# Patient Record
Sex: FEMALE | Race: WHITE | Employment: FULL TIME | URBAN - METROPOLITAN AREA
[De-identification: names, ages, dates, MRNs, and addresses within clinical notes are randomized per-mention and may not be internally consistent; named-entity substitution may affect disease eponyms.]

---

## 2017-11-13 ENCOUNTER — OFFICE VISIT (OUTPATIENT)
Dept: FAMILY MEDICINE CLINIC | Age: 32
End: 2017-11-13

## 2017-11-13 VITALS
SYSTOLIC BLOOD PRESSURE: 114 MMHG | WEIGHT: 126 LBS | DIASTOLIC BLOOD PRESSURE: 68 MMHG | RESPIRATION RATE: 18 BRPM | OXYGEN SATURATION: 99 % | HEIGHT: 65 IN | HEART RATE: 100 BPM | TEMPERATURE: 98.3 F | BODY MASS INDEX: 20.99 KG/M2

## 2017-11-13 DIAGNOSIS — N61.0 MASTITIS, RIGHT, ACUTE: Primary | ICD-10-CM

## 2017-11-13 RX ORDER — CEPHALEXIN 500 MG/1
500 CAPSULE ORAL 4 TIMES DAILY
Qty: 40 CAP | Refills: 0 | Status: SHIPPED | OUTPATIENT
Start: 2017-11-13 | End: 2017-11-23

## 2017-11-13 NOTE — PROGRESS NOTES
Patient: Saira Farah MRN: [id number]  SSN: [id number]    YOB: 1985  Age: 27 y.o. Sex: female      Date of Service: 11/13/2017   Provider: AN Bergeron   Office Location:   [address] [facility], [address] [place] [address], [address]  Office Phone: [phone]  Office Fax: [phone]        REASON FOR VISIT:   Chief Complaint   Patient presents with    Breast pain     pt. states right sided breast pain, states it is a sharp achy pain and tender to the touch         VITALS:   Visit Vitals    /68 (BP 1 Location: Left arm, BP Patient Position: Sitting)    Pulse 100    Temp 98.3 °F (36.8 °C) (Oral)    Resp 18    Ht 5' 5\" (1.651 m)    Wt 126 lb (57.2 kg)    SpO2 99%    BMI 20.97 kg/m2       MEDICATIONS:   Current Outpatient Prescriptions   Medication Sig Dispense Refill    docusate sodium (COLACE) 100 mg capsule Take 100 mg by mouth two (2) times daily as needed for Constipation.  PRENATAL VITS W-CA,FE,FA,<1MG, (PRENATAL #2 PO) Take 1 Tab by mouth daily.  ranitidine (ZANTAC) 150 mg tablet Take 150 mg by mouth two (2) times a day.  Indications: HEARTBURN          ALLERGIES:   No Known Allergies     ACTIVE MEDICAL PROBLEMS:  Patient Active Problem List   Diagnosis Code   (none) - all problems resolved or deleted        MEDICAL/SURGICAL HISTORY:  Past Medical History:   Diagnosis Date    Thyroiditis       Past Surgical History:   Procedure Laterality Date    HX APPENDECTOMY  200    HX HERNIA REPAIR  1989        FAMILY HISTORY:  Family History   Problem Relation Age of Onset    Heart Disease Father     Hypertension Father     Hypertension Maternal Grandmother     Hypertension Maternal Grandfather     Hypertension Paternal Grandmother     Hypertension Paternal Grandfather         SOCIAL HISTORY:  Social History   Substance Use Topics    Smoking status: Former Smoker     Years: 5.00     Quit date: 11/8/2009    Smokeless tobacco: Never Used      Comment: Educated on second hand smoke    Alcohol use No        HISTORY OF PRESENT ILLNESS:   Rosalia Byers is a 27 y.o. female who presents to the office as a [address] patient for acute care. PCP: Shiloh Lang    Patient complains of redness, tenderness, and warmth in her R breast, as well as subjective fever, since this morning. Patient is breast feeding her 9 month old son, and suspects she may have mastitis, though she has no prior history of this. She has been applying warm compresses, continuing to pump her breast milk, and taking Tylenol and ibuprofen with minimal improvement in symptoms. She denies purulent nipple drainage or breast lumps/masses. REVIEW OF SYSTEMS:  Review of Systems   Constitutional: Positive for fever ( subjective) and malaise/fatigue. Negative for chills. Respiratory: Negative for cough, shortness of breath and wheezing. Cardiovascular: Negative for chest pain and palpitations. Gastrointestinal: Negative for diarrhea, nausea and vomiting. PHYSICAL EXAMINATION:  Physical Exam   Constitutional: She is oriented to person, place, and time and well-developed, well-nourished, and in no distress. Cardiovascular: Normal rate, regular rhythm and normal heart sounds. Exam reveals no gallop and no friction rub. No murmur heard. Pulmonary/Chest: Effort normal and breath sounds normal. She has no wheezes. She has no rales. Neurological: She is alert and oriented to person, place, and time. Gait normal.   Skin: Skin is warm and dry. There is erythema. Breasts: Left breast with faint streaky erythema extending from nipple toward 10:00 direction. Tender to palpation over this area. No palpable masses. Right breast unremarkable. RESULTS:  No results found for this visit on 11/13/17. ASSESSMENT/PLAN:  Diagnoses and all orders for this visit:    1.  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 Follow up as needed if symptoms persist or worsen    Patient expresses understanding and is agreeable with the above plan.       PATIENT CARE TEAM:   Patient Care Team:  Dorie Quinn MD as PCP - General (Internal Medicine)  Windy Giordano MD (Physical Medicine and Rehab)       AN Womack   November 13, 2017    3:26 PM

## 2017-11-13 NOTE — MR AVS SNAPSHOT
Visit Information Date & Time Provider Department Dept. Phone Encounter #  
 11/13/2017  3:00 [facility] [phone] [id number] Upcoming Health Maintenance Date Due DTaP/Tdap/Td series (1 - Tdap) 4/16/2004 PAP AKA CERVICAL CYTOLOGY 9/28/2014 Influenza Age 5 to Adult 8/1/2017 Allergies as of 11/13/2017  Review Complete On: 11/13/2017 By: Randye Merlin, PA No Known Allergies Current Immunizations  Reviewed on 5/8/2013 No immunizations on file. Not reviewed this visit You Were Diagnosed With   
  
 Codes Comments Mastitis, right, acute    -  Primary ICD-10-CM: N61.0 ICD-9-CM: 611.0 Vitals BP Pulse Temp Resp Height(growth percentile) Weight(growth percentile) 114/68 (BP 1 Location: Left arm, BP Patient Position: Sitting) 100 98.3 °F (36.8 °C) (Oral) 18 5' 5\" (1.651 m) 126 lb (57.2 kg) SpO2 BMI OB Status Smoking Status 99% 20.97 kg/m2 Recent pregnancy Former Smoker Vitals History BMI and BSA Data Body Mass Index Body Surface Area  
 20.97 kg/m 2 1.62 m 2 Preferred Pharmacy Pharmacy Name Phone [address], [address] [address] [phone] Your Updated Medication List  
  
   
This list is accurate as of: 11/13/17  3:20 PM.  Always use your most recent med list.  
  
  
  
  
 cephALEXin 500 mg capsule Commonly known as:  Marco Antonio Frohlich Take 1 Cap by mouth four (4) times daily for 10 days. COLACE 100 mg capsule Generic drug:  docusate sodium Take 100 mg by mouth two (2) times daily as needed for Constipation. PRENATAL #2 PO Take 1 Tab by mouth daily. ZANTAC 150 mg tablet Generic drug:  raNITIdine Take 150 mg by mouth two (2) times a day. Indications: HEARTBURN Prescriptions Sent to Pharmacy  Refills  
 cephALEXin (KEFLEX) 500 mg capsule 0  
 Sig: Take 1 Cap by mouth four (4) times daily for 10 days. Class: Normal  
 Pharmacy: [address] #: [phone] Route: Oral  
  
Introducing \[facility]\"" & HEALTH SERVICES! Senia Martinez introduces [facility] patient portal. Now you can access parts of your medical record, email your doctor's office, and request medication refills online. 1. In your internet browser, go to [email or web address] 2. Click on the First Time User? Click Here link in the Sign In box. You will see the New Member Sign Up page. 3. Enter your [facility] Access Code exactly as it appears below. You will not need to use this code after youve completed the sign-up process. If you do not sign up before the expiration date, you must request a new code. · [facility] Access Code: [email or web address] Expires: 2/11/2018  3:02 PM 
 
4. Enter the last four digits of your Social Security Number (xxxx) and Date of Birth (mm/dd/yyyy) as indicated and click Submit. You will be taken to the next sign-up page. 5. Create a [facility] ID. This will be your [facility] login ID and cannot be changed, so think of one that is secure and easy to remember. 6. Create a [facility] password. You can change your password at any time. 7. Enter your Password Reset Question and Answer. This can be used at a later time if you forget your password. 8. Enter your e-mail address. You will receive e-mail notification when new information is available in [address] 9. Click Sign Up. You can now view and download portions of your medical record. 10. Click the Download Summary menu link to download a portable copy of your medical information. If you have questions, please visit the Frequently Asked Questions section of the [facility] website. Remember, [facility] is NOT to be used for urgent needs. For medical emergencies, dial 911. Now available from your iPhone and Android! Please provide this summary of care documentation to your next provider. Your primary care clinician is listed as Kong Carreon. If you have any questions after today's visit, please call [phone].

## 2017-11-13 NOTE — LETTER
NOTIFICATION RETURN TO WORK  
 
11/13/2017 3:18 PM 
 
Ms. Carlos Eckert [facility] [phone] [id number] To Whom It May Concern: 
 
Carlos Eckert is currently under the care of [facility]. She will return to work 11/15/17 If there are questions or concerns please have the patient contact our office.  
 
 
 
Sincerely, 
 
 
AN Weiss

## 2024-03-29 ENCOUNTER — HOSPITAL ENCOUNTER (OUTPATIENT)
Facility: HOSPITAL | Age: 39
Discharge: HOME | End: 2024-03-29
Payer: COMMERCIAL

## 2024-03-29 DIAGNOSIS — R74.8 ABNORMAL LIVER ENZYMES: ICD-10-CM

## 2024-03-29 PROCEDURE — 76705 ECHO EXAM OF ABDOMEN: CPT

## 2025-08-30 ENCOUNTER — HOSPITAL ENCOUNTER (EMERGENCY)
Facility: HOSPITAL | Age: 40
Discharge: HOME | End: 2025-08-31
Payer: COMMERCIAL

## 2025-08-30 VITALS
BODY MASS INDEX: 22.99 KG/M2 | RESPIRATION RATE: 16 BRPM | HEART RATE: 93 BPM | DIASTOLIC BLOOD PRESSURE: 70 MMHG | HEIGHT: 65 IN | OXYGEN SATURATION: 100 % | TEMPERATURE: 98.3 F | WEIGHT: 138 LBS | SYSTOLIC BLOOD PRESSURE: 143 MMHG
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
## 2025-08-30 PROCEDURE — 12011 RPR F/E/E/N/L/M 2.5 CM/<: CPT

## 2025-08-30 PROCEDURE — 99284 EMERGENCY DEPT VISIT MOD MDM: CPT

## 2025-08-30 ASSESSMENT — LIFESTYLE VARIABLES
HOW OFTEN DO YOU HAVE A DRINK CONTAINING ALCOHOL: 2-4 TIMES A MONTH
HOW MANY STANDARD DRINKS CONTAINING ALCOHOL DO YOU HAVE ON A TYPICAL DAY: 1 OR 2

## 2025-08-30 ASSESSMENT — PAIN DESCRIPTION - ORIENTATION: ORIENTATION: LEFT

## 2025-08-30 ASSESSMENT — PAIN - FUNCTIONAL ASSESSMENT: PAIN_FUNCTIONAL_ASSESSMENT: 0-10

## 2025-08-30 ASSESSMENT — PAIN DESCRIPTION - LOCATION: LOCATION: FACE;EYE

## 2025-08-30 ASSESSMENT — PAIN SCALES - GENERAL: PAINLEVEL_OUTOF10: 2

## 2025-08-31 PROCEDURE — 90471 IMMUNIZATION ADMIN: CPT | Performed by: EMERGENCY MEDICINE

## 2025-08-31 PROCEDURE — 6370000000 HC RX 637 (ALT 250 FOR IP): Performed by: PHYSICIAN ASSISTANT

## 2025-08-31 PROCEDURE — 90714 TD VACC NO PRESV 7 YRS+ IM: CPT | Performed by: EMERGENCY MEDICINE

## 2025-08-31 PROCEDURE — 6360000002 HC RX W HCPCS: Performed by: EMERGENCY MEDICINE

## 2025-08-31 RX ADMIN — AMOXICILLIN AND CLAVULANATE POTASSIUM 1 TABLET: 875; 125 TABLET, FILM COATED ORAL at 00:29

## 2025-08-31 RX ADMIN — CLOSTRIDIUM TETANI TOXOID ANTIGEN (FORMALDEHYDE INACTIVATED) AND CORYNEBACTERIUM DIPHTHERIAE TOXOID ANTIGEN (FORMALDEHYDE INACTIVATED) 0.5 ML: 5; 2 INJECTION, SUSPENSION INTRAMUSCULAR at 00:29
